# Patient Record
Sex: FEMALE | Race: WHITE
[De-identification: names, ages, dates, MRNs, and addresses within clinical notes are randomized per-mention and may not be internally consistent; named-entity substitution may affect disease eponyms.]

---

## 2017-01-10 NOTE — RADIATION ONCOLOGY FOLLOW-UP
Radiation Oncology Follow-Up


Date of Visit


Alex 10, 2017.





Reason For Visit


Annual follow-up





Radiation Completion Date


5/28/14





Diagnosis





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


Status:  Resolved        Onset Date:  8/19/2013


Histology Subtype:  ductal


Stage:  ll (B)


Permanent Comment:  Right breast mass


Status post biopsy of the right breast and axilla revealing infiltrating ductal 

carcinoma 08/19/2013


Estrogen receptor negative, progesterone receptor negative, HER-2/terry negative


Status post neoadjuvant chemotherapy Adriamycin and Cytoxan followed by Taxol 

for 4 cycles


Status post right breast skin sparing mastectomy and right axillary lymph node 

dissection, left breast skin sparing mastectomy and lymph node injection and 

sentinel lymph node biopsy 02/26/2015


Right breast [id number] left breast no pathologic findings


Status post reconstruction with bilateral silicone implants


Status post completion of radiation therapy 05/28/2014 received 6120 cGy


BRCA1 testing positive  Last Edited By: Shana Tanner on Jan 12, 2016 13:29





Interim History


She's been doing well over this past year.  She has noticed no masses or 

tenderness and no change of the axilla.  At her last visit she had some mild 

swelling and discomfort of the left axilla.  This has resolved.  She has 

noticed no swelling of her arm.  She had previously undergone physical therapy 

which helped to relieve the swelling and discomfort.  She continues regular 

follow-up with the plastic surgeon.  She has been discharged from the breast 

surgeon.  She continues regular follow-up with medical oncology.





Allergies


Coded Allergies:  


     Sulfa Drugs (Verified  Allergy, Unknown, HIVES, 1/14/16)





Home Medications


No Active Prescriptions or Reported Meds





Review of Systems


Gastrointestinal:  


   Symptoms:  WNL


Oral:  


   Symptoms:  No Problems


Respiratory:  


   Symptoms:  WNL


Urinary:  


   Symptoms:  WNL


Skin:  


   Symptoms:  No Problems


Breast:  


   Right Upper Arm Measurement:  29.0


   Right Mid Arm Measurement:  23.3


   Right Wrist Measurement:  15.3


   Left Upper Arm Measurement:  29.0


   Left Mid Arm Measurement:  23.3


   Left Wrist Measurement:  15.5


   Arm Dominence:  Right





Physical Exam





Vital Signs








  Date Time  Temp Pulse Resp B/P Pulse Ox O2 Delivery O2 Flow Rate FiO2


 


1/10/17 14:22 37.0 88 12 128/85 96   








Fatigue:  None


General Appearance:  no apparent distress


Eyes:  normal inspection, EOMI


ENT:  normal ENT inspection, hearing grossly normal


Neck:  no adenopathy


Respiratory/Chest:  lungs clear, no respiratory distress, no accessory muscle 

use


Breast:


Examination of the chest reveals bilateral implants.  There is some mild 

dryness of the skin which is patchy bilaterally.  This is mostly noted in the 

inner portion of the implanted breast area.  There are no masses or tenderness 

no axillary adenopathy.  Using the Huntington score cosmesis she has an excellent 

outcome.  There are no skin retractions or telangiectasis.


Cardiovascular:  regular rate, rhythm, no gallop, no murmur


Abdomen:  non tender, soft


Extremities:  no pedal edema


Neurologic/Psychiatric:  no motor/sensory deficits, alert, normal mood/affect


Skin:  warm/dry


Lymphatic:  no adenopathy





Laboratory Studies











Test


  10/11/16


15:56


 


White Blood Count


  4.36 K/uL


(4.8-10.8)


 


Red Blood Count


  4.22 M/uL


(4.2-5.4)


 


Hemoglobin


  12.6 g/dl


(12.0-16.0)


 


Hematocrit 37.9 % (37-47) 


 


Mean Corpuscular Volume


  89.8 fL


()


 


Mean Corpuscular Hemoglobin


  29.9 pg


(25-34)


 


Mean Corpuscular Hemoglobin


Concent 33.2 g/dl


(32-36)


 


Platelet Count


  228 K/uL


(130-400)


 


Mean Platelet Volume


  9.2 fL


(7.4-10.4)


 


Neutrophils (%) (Auto) 48.6 % 


 


Lymphocytes (%) (Auto) 39.9 % 


 


Monocytes (%) (Auto) 8.7 % 


 


Eosinophils (%) (Auto) 2.3 % 


 


Basophils (%) (Auto) 0.5 % 


 


Neutrophils # (Auto)


  2.12 K/uL


(1.4-6.5)


 


Lymphocytes # (Auto)


  1.74 K/uL


(1.2-3.4)


 


Monocytes # (Auto)


  0.38 K/uL


(0.11-0.59)


 


Eosinophils # (Auto)


  0.10 K/uL


(0-0.5)


 


Basophils # (Auto)


  0.02 K/uL


(0-0.2)


 


RDW Standard Deviation


  43.2 fL


(36.4-46.3)


 


RDW Coefficient of Variation


  13.3 %


(11.5-14.5)


 


Immature Granulocyte % (Auto) 0.0 % 


 


Immature Granulocyte # (Auto)


  0.00 K/uL


(0.00-0.02)


 


Sodium Level


  140 mmol/L


(136-145)


 


Potassium Level


  3.9 mmol/L


(3.5-5.1)


 


Chloride Level


  104 mmol/L


()


 


Carbon Dioxide Level


  29 mmol/L


(21-32)


 


Anion Gap


  7.0 mmol/L


(3-11)


 


Blood Urea Nitrogen


  11 mg/dl


(7-18)


 


Creatinine


  0.65 mg/dl


(0.60-1.20)


 


Est Creatinine Clear Calc


Drug Dose 90.1 ml/min 


 


 


Estimated GFR (


American) 117.5 


 


 


Estimated GFR (Non-


American 101.4 


 


 


BUN/Creatinine Ratio 16.3 (10-20) 


 


Random Glucose


  101 mg/dl


(70-99)


 


Calcium Level


  9.3 mg/dl


(8.5-10.1)


 


Total Bilirubin


  0.5 mg/dl


(0.2-1)


 


Aspartate Amino Transferase


(AST) 41 U/L (15-37) 


 


 


Alanine Aminotransferase (ALT) 32 U/L (12-78) 


 


Alkaline Phosphatase


  76 U/L


()


 


Total Protein


  7.8 gm/dl


(6.4-8.2)


 


Albumin


  3.9 gm/dl


(3.4-5.0)


 


Globulin


  3.9 gm/dl


(2.5-4.0)


 


Albumin/Globulin Ratio 1.0 (0.9-2) 











Assessment & Plan


Plan: Continue regular follow-up with medical oncology as well as her plastic 

surgeon.  Continue follow-up with her primary care physician.  We asked her to 

return to our office in 1 year.  She may call if she has a questions or 

concerns.  Also recommended the use of Aquaphor to the areas of skin dryness.





Total Time In Follow-Up


I spent 15 minutes speaking to the patient and performing examination.  I spent 

15 minutes reviewing information in completing this note.





Copy To


Cesilia Aaron CRNP; Mike Lyman D.O.Int.Med.

## 2017-01-25 NOTE — DIAGNOSTIC IMAGING REPORT
TWO VIEW CHEST



CLINICAL HISTORY: Cough. Atypical chest pain.



FINDINGS: PA and lateral chest radiographs are compared to study dated 2/16/2013

and correlated with chest CT dated 9/4/2013. The cardiomediastinal silhouette is

unremarkable.  The lungs and pleural spaces are clear. Right apical scarring is

identified. There is no pneumothorax. The skeletal structures are osteopenic.

The bony thorax appears intact. Surgical clips are noted both axillae.



IMPRESSION: No active disease in the chest.







Electronically signed by:  Nemesio Manriquez M.D.

1/25/2017 7:47 PM



Dictated Date/Time:  1/25/2017 7:45 PM

## 2017-01-31 NOTE — DIAGNOSTIC IMAGING REPORT
THYROID ULTRASONOGRAPHY



CLINICAL HISTORY: Breast carcinoma, cervical lymphadenopathy.    



COMPARISON STUDY:  No previous studies for comparison.



FINDINGS: The right lobe measures 5.2 x 1.4 x 2.1 cm. The left lobe measures 5.4

x 1.1 x 1.8 cm. There is a 6 x 4 x 3 mm hypoechoic nodule within the left lobe

laterally. No right lobe nodules are visualized.



Within the right neck in the region of concern, there are 2 hypoechoic lesions

the largest of which measures 7 mm. These likely represent lymph nodes. Despite

the normal size, no normal fatty hilum is visualized and therefore they may be

pathologic. Close follow-up is advocated.



IMPRESSION:  

1. Within the right lateral neck there are two hypoechoic lesions the largest of

which measures 7 mm. These likely represent lymph nodes and are potentially 

pathologic as normal fatty shilpa are not visualized.

2. 6 mm left lobe thyroid nodule









Electronically signed by:  Diomedes Contreras M.D.

1/31/2017 9:43 AM



Dictated Date/Time:  1/31/2017 9:40 AM

## 2017-02-07 NOTE — DIAGNOSTIC IMAGING REPORT
ULTRASOUND-GUIDED FINE-NEEDLE ASPIRATION BIOPSY OF RIGHT NECK LYMPH NODES



CLINICAL HISTORY: CERVICAL LYMPHADENOPATHY    



COMPARISON STUDY:  Ultrasound study dated 1/31/2017



FINDINGS: A timeout was performed. The risks of procedure were explained the

patient and informed consent was obtained. The patient was prepped in a sterile

fashion. The skin was anesthetized 1% lidocaine. Under ultrasound guidance, 4

passes utilizing a 25-gauge needle were performed into the right posterior

triangle lymph nodes of concern. Initial pathologic review indicates

satisfactory material. Final pathology is pending at this time.



IMPRESSION:  Successful ultrasound-guided fine-needle aspiration biopsy of right

neck posterior triangle lymph nodes. 









Electronically signed by:  Diomedes Contreras M.D.

2/7/2017 11:01 AM



Dictated Date/Time:  2/7/2017 10:54 AM

## 2017-02-22 NOTE — DIAGNOSTIC IMAGING REPORT
PET/CT



CLINICAL HISTORY: Triple negative breast cancer with metastasis/recurrence in

right cervical lymph node biopsy.    



TECHNIQUE:  A PET/CT was performed from the skull base through the upper thighs

following intravenous injection of 15.90 mCi of F 18 FDG IV. The injection was

performed at 9:36 AM on February 22, 2017 and imaging began at 10:23 on February 22, 2017. Unenhanced CT was performed for attenuation correction purposes and

anatomic localization.



COMPARISON STUDY:  CT of the chest, abdomen and pelvis September 4, 2013.



FINDINGS: 



Head and neck: An FDG avid left supraclavicular lymph node shown on image 50 of

267 measures 1.8 x 1.2 cm. This node has an SUV max of 5.8. This is new since

chest CT of September 4, 2013. There are several mildly enlarged FDG avid right

supraclavicular and lower cervical lymph nodes as well.



Chest: There are numerous FDG avid bulky left axillary lymph nodes. An upper

left axillary lymph node shown on image 59 measures 4 x 2.5 cm and has an SUV

max of 11.5. An inferior left axillary node shown on image 75 measures 3 x 3 cm

and has an SUV max of 11.2. There are suspected bilateral mastectomies with

bilateral breast implants. Note is made of a lobulated 3.2 x 2.9 cm mass along

the inferior medial aspect of the left implant which has an SUV max of 11.6.

Numerous FDG avid internal mammary lymph nodes are noted. A dominant left

internal mammary chain node shown image 70 measures 2 x 1.4 cm and has an SUV

max of 8.8. There are several extrapleural nodules within the lower aspect of

the right hemithorax, the largest of which is shown on image 101, measuring 2 x

0.9 cm. This has an SUV max of 2.3. Several FDG avid masses are shown within the

musculature of the right lateral chest wall. These suggest metastases as well. 



Abdomen and Pelvis: No suspicious FDG uptake is identified within the abdomen or

the pelvis. There is a gallstone within the gallbladder. There is no abdominal

or pelvic lymphadenopathy.



Musculoskeletal: There is been interval development of a 9 mm sclerotic lesion

within the greater trochanter of the right femur shown on image 226. This has no

significant FDG uptake.



IMPRESSION:  



1. Findings consistent with extensive metastatic disease, including FDG avid

bulky left axillary lymphadenopathy. In addition, there is bilateral

supraclavicular, internal mammary and right axillary lymphadenopathy as well as

several FDG avid metastases within the musculature of the right lateral chest

wall. Several FDG avid right pleural/extrapleural implants suggest metastatic

disease.



2. FDG avid 3.2 x 2.9 cm lobulated mass inferomedial to the left implant. This

is consistent with a neoplastic process and may reflect a primary breast cancer

or metastasis.



3. New 9 mm sclerotic lesion within the greater trochanter of the right femur

which may reflect a small skeletal metastasis.







Electronically signed by:  Rich Chen M.D.

2/22/2017 9:07 PM



Dictated Date/Time:  2/22/2017 12:44 PM

## 2017-07-17 NOTE — DIAGNOSTIC IMAGING REPORT
PET/CT SKULL-THIGH



CLINICAL HISTORY: BREAST CANCER    



COMPARISON STUDY:  2/20/2017



FINDINGS: The patient was injected with 12.5 millicuries of F 18 labeled FDG.

Findings standard induction phase, PET/CT scanning was performed from the skull

base to the upper thigh region.



Within the neck, there is increasing FDG avid focus fusing to the right

nasopharynx near the level of the fossa of Rosenmuller. This has an SUV maximum

of 4.3. There are mildly FDG avid cervical lymph nodes. Posterior triangle lymph

nodes in the right are mildly FDG avid with SUV maximum of 2. The largest node

measures 11 mm. This is minimally larger than on the prior study.



There is marked interval decrease in the size of the previous identified

intensely FDG avid left axillary lymph nodes. The largest node currently

measures 17 mm. This is FDG avid with SUV maximum of 6.4. There is a new focus

of increased FDG activity fusing to a right-sided subcarinal lymph node. This

has an SUV maximum of 4.2. There is been resolution of the pathologic activity

in the region of the left internal mammary chain. There are persistent foci of

intramuscular activity adjacent to the right scapula with SUV maximum of 3.8.

There is decreased activity involving the left anterior chest wall mass which

currently has an SUV maximum of 3.8. There are postsurgical changes of bilateral

mastectomies. Right upper lobe airspace opacities are likely secondary to post

radiation pneumonitis.



There are no FDG avid hepatic or splenic lesions. Note is made of

cholelithiasis. There is no FDG avid adenopathy within the abdomen or pelvis.



Mild diffuse skeletal activity, likely is related to marrow stimulation.



IMPRESSION:  

1. Significant interval improvement, although there does appear to be a new FDG

avid right subcarinal lymph node, as well as increasingly FDG avid focus in the

region of the right nasopharynx.

2. Pathologic lesions involving the left chest wall left axilla and left

internal mammary chain are markedly improved 









Electronically signed by:  Diomedes Contreras M.D.

7/17/2017 12:57 PM



Dictated Date/Time:  7/17/2017 12:46 PM

## 2017-10-16 NOTE — DIAGNOSTIC IMAGING REPORT
CT SCAN OF THE CHEST WITH IV CONTRAST



CLINICAL HISTORY: Breast cancer.



COMPARISON STUDY:  Chest CT dated 9/4/2013. PET/CT dated 7/17/2017.



TECHNIQUE: Following the IV administration of 119 cc of Optiray 320, CT scan of

the thorax was performed from the thoracic inlet to the upper abdomen. Images

are reviewed in the axial, sagittal, and coronal planes. IV contrast was

administered without complication.  A dose lowering technique was utilized

adhering to the principles of ALARA.



CT DOSE: 606.41 mGy.cm



FINDINGS:



Thyroid: Imaged portions of the thyroid gland are normal in size and

attenuation.



Thoracic aorta: The thoracic aorta is normal in caliber and demonstrates

standard 3-vessel arch anatomy. No dissection is seen.



Pulmonary vasculature: The pulmonary trunk is normal in caliber. There are no

filling defects identified in the central pulmonary vessels to indicate

pulmonary embolus. Note that this examination was not protocoled for evaluation

of the pulmonary arteries.



Heart: The heart is normal in size and configuration, and without pericardial

effusion.



Lungs and pleural spaces: There are numerous (greater than 10) pleural-based

metastatic lesions identified at the right lung base posteriorly. Groundglass

opacities and scarring are present at the right apex. The lungs are otherwise

clear. No parenchymal pulmonary lesion is seen. There is no pleural effusion.

The trachea and central airways are clear.



Mediastinum: Mediastinal lymphadenopathy has progressed. A right paratracheal

node on image #81 measures 1.7 x 2.3 cm. A subcarinal node on image #119

measures 1.9 x 3.0 cm. The largest is seen on image #218 and measures 3.2 x 1.3

cm. Additional representative lesions are seen on images #198, #212, #247, and

47, #251, and #263 these lesions extend into the right paravertebral region.



Elizabeth: Clear.



Axillae: Surgical clips are present within the axilla bilaterally. There are

pathologically enlarged left axillary and subpectoral nodes which have increased

in size from previous. The largest left axillary node is seen on image #70 and

measures 3.0 x 1.7 cm (previously measuring up to 1.8 cm). Slightly hyperemic

right subpectoral nodes seen on image #48 measure up to 6 mm and are also

concerning for metastatic disease.



Internal mammary chain: Pathologically enlarged right internal mammary lymph

nodes have increased in size. The largest node is seen on image #85 and measures

1.2 x 1.8 cm. No left internal mammary lymph nodes are identified.



Upper abdomen: There is evidence of hepatic steatosis. A 1.2 cm retrocrural

lymph node is seen image #265.



Skeletal structures: The skeletal structures appear osteopenic. No lytic or

blastic bony lesions are clearly seen.



Soft tissues: There are postoperative changes from bilateral mastectomy with

bilateral breast implants. Large soft tissue implants are again seen in the

chest wall and an increased in size from 7/17/2017. A lesion in the left chest

wall medial to the left breast implant on image #174 measures 3.2 x 4.2 cm

(producing measured up to 2.8 cm). There are least 7 lesions in the right

lateral chest wall seen on images #132-204. The largest measures up to 3.3 x 2.8

cm (previously measured up to 2.8 cm). Small soft tissue implants are seen

superficial to the sternum the midline on images #138, #149, and #158 measuring

up to 1.4 cm. A 1.3 cm lesion is noted in the right shoulder musculature on

image #94.





IMPRESSION:



1. Overall significant progression of multifocal metastatic disease as compared

to 7/17/2017.



2. There are numerous enlarging chest wall lesions as above.



3. There is enlarging axillary, subpectoral, right internal mammary,

mediastinal, and retrocrural lymphadenopathy as above.



4. There is evidence of multifocal pleural-based metastatic disease at the right

lung base. There is no associated pleural effusion.



5. Groundglass change and scarring at the right apex is nonspecific and likely

treatment related. This is similar to previous. There is no evidence of

pneumonia.



6. No destructive bony lesion is clearly seen.



7. Hepatic steatosis.



8. Additional findings as above.







Electronically signed by:  Nemesio Manriquez M.D.

10/16/2017 12:40 PM



Dictated Date/Time:  10/16/2017 12:25 PM

## 2017-10-16 NOTE — DIAGNOSTIC IMAGING REPORT
CT ABD/PELVIS IV AND ORAL CONT



CLINICAL HISTORY: BREAST CA    



COMPARISON STUDY:  9/4/2013, PET/CT scan dated 7/17/2017



TECHNIQUE: Following the IV administration of 119 mL of Optiray-320, CT scan of

the abdomen and pelvis was performed from the lung bases to the proximal femurs.

Images are reviewed in the axial, sagittal, and coronal planes. IV contrast was

administered without complication.  A dose lowering technique was utilized

adhering to the principles of ALARA.





CT DOSE:    



FINDINGS:



Lower chest: There are bilateral breast implants. There is a 42 cm medial right

breast mass. There is a partially visualized mass involving the right lateral

chest wall which measures at least 5 cm in length.



Liver: The contrast-enhanced liver is normal in size, contour, and attenuation.

There is no intrahepatic biliary ductal dilatation. The hepatic veins and portal

veins are patent.



Gallbladder: Cholelithiasis



Spleen: Normal in size and attenuation.



Pancreas: Unremarkable.



Adrenal glands: Unremarkable.



Kidneys: There is an 8 mm left renal cyst. No solid renal masses are visualized.



Bowel: There are no transition zones indicate bowel obstruction. No acute

inflammatory changes are visualized.



Peritoneum: There is no intraperitoneal free air or abdominal ascites.



Vasculature: The abdominal aorta is normal in course and caliber.



Adenopathy: There are enlarged upper abdominal retrocrural lymph nodes. These

appear larger than on the prior PET/CT scan. These measure up to 16 mm in

diameter. There are also enlarged lower right para esophageal lymph nodes. These

also are mildly larger than on the prior study. Right paravertebral

lymphadenopathy, has also increased when compared the preceding study.



 Pelvic viscera: The uterus appears surgically absent.



Skeletal structures: No destructive osseous lesions are seen. There is a stable

nonspecific 9 mm sclerotic focus within the intertrochanteric portion of the

right hip



IMPRESSION:  

1. Progression of disease

2. Enlarging 42 mm left medial breast mass

3. Partially visualized right lateral chest wall mass

4. Progressive right-sided paravertebral lymphadenopathy

5. Progressive para esophageal adenopathy

6. Progressive retrocrural lymphadenopathy







Electronically signed by:  Diomedes Contreras M.D.

10/16/2017 12:20 PM



Dictated Date/Time:  10/16/2017 12:10 PM

## 2017-10-25 NOTE — DIAGNOSTIC IMAGING REPORT
THORACIC SPINE 3 VIEWS ROUTINE, L-SPINE MIN 4 VIEWS ROUTINE



HISTORY:  52 years-old Female M54.9 Back painpt. has breast [id number]

acute back pain with history of breast cancer



COMPARISON: CT chest, abdomen and pelvis 10/16/2017



TECHNIQUE: Frontal and lateral views of the thoracic spine with 5 views of the

lumbar spine



FINDINGS: 



THORACIC:

There are 12 thoracic type vertebral segments. There is a few degrees of convex

right curvature of the midthoracic spine. Mild multilevel endplate spurring and

intervertebral disc space narrowing. No acute fracture or subluxation. No

definite suspicious lytic or blastic bony lesions identified. Surgical clips

project over the upper chest.



LUMBAR:

 5 lumbar type vertebral segments. No spondylolysis or spondylolisthesis. No

acute fracture or subluxation. Mild multilevel endplate spurring and

intervertebral disc space narrowing with facet arthrosis. No suspicious lytic or

blastic bony lesions. Moderate formed stool of the colon, notably the ascending

colon.



IMPRESSION: 

1. No suspicious lytic or blastic bony lesions of the thoracic or lumbar spine.

2. Mostly mild multilevel degenerative changes of the spine without acute

fracture or subluxation. 







The above report was generated using voice recognition software. It may contain

grammatical, syntax or spelling errors.







Electronically signed by:  Kyle Laboy M.D.

10/25/2017 5:20 PM



Dictated Date/Time:  10/25/2017 5:13 PM

## 2017-10-25 NOTE — DIAGNOSTIC IMAGING REPORT
THORACIC SPINE 3 VIEWS ROUTINE, L-SPINE MIN 4 VIEWS ROUTINE



HISTORY:  52 years-old Female M54.9 Back painpt. has breast [id number]

acute back pain with history of breast cancer



COMPARISON: CT chest, abdomen and pelvis 10/16/2017



TECHNIQUE: Frontal and lateral views of the thoracic spine with 5 views of the

lumbar spine



FINDINGS: 



THORACIC:

There are 12 thoracic type vertebral segments. There is a few degrees of convex

right curvature of the midthoracic spine. Mild multilevel endplate spurring and

intervertebral disc space narrowing. No acute fracture or subluxation. No

definite suspicious lytic or blastic bony lesions identified. Surgical clips

project over the upper chest.



LUMBAR:

 5 lumbar type vertebral segments. No spondylolysis or spondylolisthesis. No

acute fracture or subluxation. Mild multilevel endplate spurring and

intervertebral disc space narrowing with facet arthrosis. No suspicious lytic or

blastic bony lesions. Moderate formed stool of the colon, notably the ascending

colon.



IMPRESSION: 

1. No suspicious lytic or blastic bony lesions of the thoracic or lumbar spine.

2. Mostly mild multilevel degenerative changes of the spine without acute

fracture or subluxation. 







The above report was generated using voice recognition software. It may contain

grammatical, syntax or spelling errors.







Electronically signed by:  Kyle Laboy M.D.

10/25/2017 5:20 PM



Dictated Date/Time:  10/25/2017 5:13 PM

## 2018-01-10 ENCOUNTER — HOSPITAL ENCOUNTER (OUTPATIENT)
Dept: HOSPITAL 45 - C.ONC | Age: 53
Discharge: HOME | End: 2018-01-10
Attending: PHYSICIAN ASSISTANT
Payer: COMMERCIAL

## 2018-01-10 VITALS
HEART RATE: 91 BPM | OXYGEN SATURATION: 100 % | TEMPERATURE: 98.06 F | SYSTOLIC BLOOD PRESSURE: 126 MMHG | DIASTOLIC BLOOD PRESSURE: 87 MMHG

## 2018-01-10 DIAGNOSIS — Z92.3: ICD-10-CM

## 2018-01-10 DIAGNOSIS — Z08: Primary | ICD-10-CM

## 2018-01-10 DIAGNOSIS — Z85.3: ICD-10-CM

## 2018-01-10 NOTE — RADIATION ONCOLOGY FOLLOW-UP
Radiation Oncology Follow-Up


Date of Visit


Alex 10, 2018.





Reason For Visit


Annual follow-up





Radiation Completion Date


5/28/14





Diagnosis





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


Status:  Chronic        Onset Date:  8/19/2013


Histology Subtype:  ductal


Stage:  IV


Permanent Comment:  Right breast mass


Status post biopsy of the right breast and axilla revealing infiltrating ductal 

carcinoma 08/19/2013


Estrogen receptor negative, progesterone receptor negative, HER-2/terry negative


Status post neoadjuvant chemotherapy Adriamycin and Cytoxan followed by Taxol 

for 4 cycles


Status post right breast skin sparing mastectomy and right axillary lymph node 

dissection, left breast skin sparing mastectomy and lymph node injection and 

sentinel lymph node biopsy 02/26/2015


Right breast [id number] left breast no pathologic findings


Status post reconstruction with bilateral silicone implants


Status post completion of radiation therapy 05/28/2014 received 6120 cGy


BRCA1 testing positive


Finding of a right neck mass February 2017


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


PET/CT revealing extensive metastatic disease.


6 cycles of chemotherapy with Abraxane


PET/CT revealing improvement July 2017


Recheck CT October 2017 showing progression


Currently undergoing chemotherapy with gemcitabine and carboplatin  Last Edited 

By: Shana Tanner on Alex 10, 2018 16:32





Interim History


One month following her visit last year she palpated a mass in the right neck.  

She underwent an FNA which unfortunately revealed this to be metastatic 

carcinoma of breast origin.  PET/CT revealed significant metastatic disease.  

She began chemotherapy with Abraxane and had a recheck PET/CT then in July 

which showed improvement.  This was after 6 cycles of Abraxane.  Recheck CT in 

October 2017 revealed progression of disease.  She was then started on 

gemcitabine and carboplatin.  She has now received 5 cycles of treatment.  With 

this second regimen of treatment she has less neuropathy and less joint pain.  

She has mild nausea.  She has joint discomfort with Neulasta.  Following the 

initiation of the Abraxane the mass of the neck resolved as well as a 

thickening that could be felt in the left lower breast.





Allergies


Coded Allergies:  


     Tramadol (Verified  Allergy, Intermediate, Nausea/Vomiting , 1/10/18)


     Sulfa Drugs (Verified  Allergy, Unknown, HIVES, 1/14/16)





Home Medications


Scheduled


Potassium Chloride (Micro-K Ext Rel), 10 MEQ PO DAILY





Scheduled PRN


Oxycodone Ir (Roxicodone Ir), 1-2 TAB PO Q4H PRN for Severe Pain





Review of Systems


Gastrointestinal:  


   Symptoms:  WNL, Nausea


   GI Comments:  "Queezy" after chemo


Oral:  


   Symptoms:  No Problems


Respiratory:  


   Symptoms:  WNL


Urinary:  


   Symptoms:  WNL


Skin:  


   Symptoms:  No Problems


Breast:  


   Right Upper Arm Measurement:  28.5


   Right Mid Arm Measurement:  23.1


   Right Wrist Measurement:  15.7


   Left Upper Arm Measurement:  29.1


   Left Mid Arm Measurement:  23.0


   Left Wrist Measurement:  15.8


   Arm Dominence:  Right





Physical Exam





Vital Signs








  Date Time  Temp Pulse Resp B/P (MAP) Pulse Ox O2 Delivery O2 Flow Rate FiO2


 


1/10/18 13:35 36.7 91 16 126/87 100   








Fatigue:  None


General Appearance:  no apparent distress


Eyes:  normal inspection, EOMI


ENT:  normal ENT inspection, hearing grossly normal


Neck:  no adenopathy, thyroid normal


Respiratory/Chest:  lungs clear, no respiratory distress, no accessory muscle 

use


Breast:


Breast examination reveals bilateral implants.  There are no masses or 

tenderness and no axillary adenopathy.  She has no skin retractions or 

telangiectasia.  Using the [place] score cosmesis she has a in excellent 

outcome.


Cardiovascular:  regular rate, rhythm, no gallop, no murmur


Abdomen:  non tender, soft


Extremities:  no pedal edema


Neurologic/Psychiatric:  no motor/sensory deficits, alert, normal mood/affect





Pain Management


Patient Reports Pain:  Yes


Pain Management Plan


The patient has multiple medications available for pain as recommended through 

medical oncology.





Laboratory


Laboratory Results:  not applicable





Pathology


Pathology Results:  were reviewed


Pathology Comments


Reviewed in interim history





Imaging


Imaging Studies:  were reviewed


Imaging Comments


Reviewed in the interim history





Assessment & Plan


Plan: She'll continue regular follow-up with medical oncology and her primary 

care physician.  She is continuing on chemotherapy and has completed 5 cycles 

of gemcitabine and carboplatin.  Recheck scanning per medical oncology.  A 

follow-up appointment with our office was not given.  Discussed with her that 

she is having close follow-up with medical oncology and currently does not need 

to return to our office unless otherwise recommended by medical oncology.  She 

may call our office if she has any questions or concerns.





Total Time


In Follow-Up


I spent 20 minutes speaking to the patient and performing examination.  I spent 

15 minutes reviewing information in completing this note.





Copy To


Mike Lyman D.O.; Ze June D.O.

## 2018-01-25 ENCOUNTER — HOSPITAL ENCOUNTER (OUTPATIENT)
Dept: HOSPITAL 45 - C.ACU | Age: 53
Discharge: HOME | End: 2018-01-25
Attending: SURGERY
Payer: COMMERCIAL

## 2018-01-25 VITALS
OXYGEN SATURATION: 99 % | HEART RATE: 86 BPM | DIASTOLIC BLOOD PRESSURE: 85 MMHG | TEMPERATURE: 98.78 F | SYSTOLIC BLOOD PRESSURE: 125 MMHG

## 2018-01-25 VITALS
BODY MASS INDEX: 27.71 KG/M2 | HEIGHT: 63.5 IN | BODY MASS INDEX: 27.71 KG/M2 | HEIGHT: 63.5 IN | WEIGHT: 158.34 LBS | WEIGHT: 158.34 LBS

## 2018-01-25 VITALS
DIASTOLIC BLOOD PRESSURE: 82 MMHG | OXYGEN SATURATION: 99 % | HEART RATE: 73 BPM | SYSTOLIC BLOOD PRESSURE: 118 MMHG | TEMPERATURE: 98.6 F

## 2018-01-25 VITALS
DIASTOLIC BLOOD PRESSURE: 87 MMHG | OXYGEN SATURATION: 100 % | HEART RATE: 89 BPM | TEMPERATURE: 97.88 F | SYSTOLIC BLOOD PRESSURE: 128 MMHG

## 2018-01-25 VITALS
SYSTOLIC BLOOD PRESSURE: 120 MMHG | TEMPERATURE: 97.88 F | OXYGEN SATURATION: 100 % | HEART RATE: 76 BPM | DIASTOLIC BLOOD PRESSURE: 78 MMHG

## 2018-01-25 DIAGNOSIS — Z80.7: ICD-10-CM

## 2018-01-25 DIAGNOSIS — Z82.5: ICD-10-CM

## 2018-01-25 DIAGNOSIS — Z80.6: ICD-10-CM

## 2018-01-25 DIAGNOSIS — Z82.49: ICD-10-CM

## 2018-01-25 DIAGNOSIS — C50.919: Primary | ICD-10-CM

## 2018-01-25 DIAGNOSIS — Z83.3: ICD-10-CM

## 2018-01-25 DIAGNOSIS — I87.8: ICD-10-CM

## 2018-01-25 DIAGNOSIS — Z80.0: ICD-10-CM

## 2018-01-25 LAB
BASOPHILS # BLD: 0 K/UL (ref 0–0.2)
BASOPHILS NFR BLD: 0 %
EOS ABS #: 0.01 K/UL (ref 0–0.5)
EOSINOPHIL NFR BLD AUTO: 65 K/UL (ref 130–400)
HCT VFR BLD CALC: 26.1 % (ref 37–47)
HGB BLD-MCNC: 8.8 G/DL (ref 12–16)
IG#: 0.01 K/UL (ref 0–0.02)
IMM GRANULOCYTES NFR BLD AUTO: 52 %
LYMPHOCYTES # BLD: 1.02 K/UL (ref 1.2–3.4)
MCH RBC QN AUTO: 31.3 PG (ref 25–34)
MCHC RBC AUTO-ENTMCNC: 33.7 G/DL (ref 32–36)
MCV RBC AUTO: 92.9 FL (ref 80–100)
MONO ABS #: 0.38 K/UL (ref 0.11–0.59)
MONOCYTES NFR BLD: 19.4 %
NEUT ABS #: 0.54 K/UL (ref 1.4–6.5)
NEUTROPHILS # BLD AUTO: 0.5 %
NEUTROPHILS NFR BLD AUTO: 27.6 %
PMV BLD AUTO: 11.7 FL (ref 7.4–10.4)
RED CELL DISTRIBUTION WIDTH CV: 18.1 % (ref 11.5–14.5)
RED CELL DISTRIBUTION WIDTH SD: 58.9 FL (ref 36.4–46.3)
WBC # BLD AUTO: 1.96 K/UL (ref 4.8–10.8)

## 2018-01-25 NOTE — [facility] OPERATIVE REPORT
Operative Report


Operative Date


Jan 25, 2018.





Pre-Operative Diagnosis





Breast cancer, need for long-term venous access





Surgeon


Dr. Mikael Olivares





Findings


Real time ultrasound guidance was used for accessing the left internal jugular 

vein.  Real time fluoroscopy was used interpreted for placement of port.  A 

total of 44.8 seconds of fluoroscopy time was used.





Disposition


Recovery Room / PACU


I attest to the content of the Intraoperative Record and any orders documented 

therein.  Any exceptions are noted below.

## 2018-01-25 NOTE — ANESTHESIOLOGY PROGRESS NOTE
Anesthesia Post Op Note


Date & Time


Jan 25, 2018 at 14:11





Vital Signs


Pain Intensity:  0





Vital Signs Past 12 Hours








  Date Time  Temp Pulse Resp B/P (MAP) Pulse Ox O2 Delivery O2 Flow Rate FiO2


 


1/25/18 13:50 37.0 73 14 118/82 99 Room Air  


 


1/25/18 13:30 36.3 68 14 125/88 99 Nasal Cannula  


 


1/25/18 13:20  72 12 110/82 98 Nasal Cannula  


 


1/25/18 13:10  85 12 126/88 100 Nasal Cannula 2 


 


1/25/18 13:04 36.2 82 12 123/79 100 Nasal Cannula 2 


 


1/25/18 08:13 36.6 76 18 120/78 (92) 100 Room Air  











Notes


Mental Status:  alert / awake / arousable, participated in evaluation


Pt Amnestic to Procedure:  Yes


Nausea / Vomiting:  adequately controlled


Pain:  adequately controlled


Airway Patency, RR, SpO2:  stable & adequate


BP & HR:  stable & adequate


Hydration State:  stable & adequate


Anesthetic Complications:  no major complications apparent

## 2018-01-25 NOTE — HISTORY & PHYSICAL BRIDGE NOTE
H&P Re-Evaluation


Bridge Note:


I have examined the patient, reviewed the History & Physical and in the 

interval since the performance of the History & Physical I have noted the 

following changes of clinical significance: Patient seen and examined, agree 

with history of physical was performed.  Patient with metastatic breast cancer, 

prior port in left internal jugular vein, we will attempt to utilize this vein 

if possible, however she understands that she may require placement on the 

right.  No changes noted

## 2018-01-25 NOTE — DISCHARGE INSTRUCTIONS
Discharge Instructions


Date of Service


Jan 25, 2018.





Visit


Reason for Visit:  Right Breast Cancer





Discharge


Discharge Diagnosis / Problem:  port placement





Discharge Goals


Goal(s):  Therapeutic intervention





Activity Recommendations


Activity Limitations:  as noted below


Shower/Bathe:  no limitations





Anesthesia


.





Post Anesthesia Instructions:





If you have had General Anesthesia or IV Sedation:





*  Do not drive today.


*  Resume driving when surgeon permits.


*  Do not make important decisions or sign legal documents today.


*  Call surgeon for:





   1.  Temperature elevations greater than 101 degrees F.


   2.  Uncontrollable pain.


   3.  Excessive bleeding.


   4.  Persistent nausea and vomiting.


   5.  Medication intolerance (nausea, vomiting or rash).





*  For nausea and vomiting use only clear liquids such as: tea, soda, bouillon 

until nausea subsides, then gradually increase diet as tolerated.





*  If you have any concerns or questions, call your surgeon's office.  If 

physician is unavailable and it is an emergency, call 911 or go to the nearest 

emergency room.





.





Instructions / Follow-Up


Instructions / Follow-Up


Dr. Olivares in approx 2 weeks, call [phone] if you do not have an appt or 

have any questions


It is ok for port to be used


You can take ibuprofen 600 mg three times daily for pain in addition to Tylenol 

or oxycodone





Diet Recommendations


Recommended Home Diet:  no limitations





Pending Studies


Studies pending at discharge:  no





Medical Emergencies








.


Who to Call and When:





Medical Emergencies:  If at any time you feel your situation is an emergency, 

please call 911 immediately.





.





Non-Emergent Contact


Non-Emergency issues call your:  Surgeon


Call Non-Emergent contact if:  you have a fever, temperature is above 101.5, 

your pain is not controlled, wound has increased redness, you have any 

medication questions





.


.








"Provider Documentation" section prepared by Pio Baird.








.

## 2018-01-25 NOTE — [facility] POST OPERATIVE BRIEF NOTE
Immediate Operative Summary


Operative Date


Jan 25, 2018.





Pre-Operative Diagnosis





Breast Cancer, Poor Venous Access





Post-Operative Diagnosis





Breast Cancer, Poor Venous Access





Procedure(s) Performed





Insertion of Mediport with Fluoroscopy, Left Internal Jugular Vein





Surgeon


Dr. Mikael Olivares





Assistant Surgeon(s)


none





Estimated Blood Loss


15ml





Findings


Consistent with Post-Op Diagnosis





Specimens





none per surgeon





Drains


None





Anesthesia Type


None





Complication(s)


none





Disposition


Accompanied Pt To Recover:  no


Disposition:  Recovery Room / PACU

## 2018-01-25 NOTE — [facility] OPERATIVE REPORT
Operative Report


Operative Date


Jan 25, 2018.





Pre-Operative Diagnosis





Breast Cancer, Poor Venous Access





Post-Operative Diagnosis


same





Procedure(s) Performed


Insertion of left internal jugular vein tunneled catheter with subcutaneous 

port using fluoroscopy





Surgeon


Dr. Mikael Olivares





Assistant Surgeon(s)


none





Estimated Blood Loss


15ml





Findings


Left internal jugular vein accessed using ultrasound guidance, on first access 

wire did not go into the SVC.  The wire and needle were removed and the left 

internal jugular vein was reaccessed with ultrasound guidance and the wire went 

into the SVC.  Port placed, good placement on fluoroscopy, draws and flushes 

easily.





Specimens





none per surgeon





Drains


none





Anesthesia


MAC/local





Complication(s)


None





Disposition


Recovery Room / PACU





Indications


52-year-old female with recurrent metastatic breast cancer, need for long-term 

access for chemotherapy.  Plan for Mediport placement.  The risks of the 

procedure were discussed, all questions were answered, and the patient agreed 

to proceed with surgery as planned.





Description of Procedure


The patient was properly identified, consented, and taken to the operating room 

where she was placed in the supine position with both arms tucked and a 

shoulder roll placed vertically.  Monitored anesthesia care was induced.  SCDs 

and a safety belt were placed.  Preoperative antibiotics were administered.  

The patient's chest and neck was prepped and draped in the standard sterile 

fashion.  Surgical timeout was performed and all parties were in agreement that 

this was the correct patient and procedure to be performed and we continued as 

planned.  





The patient was placed in Trendelenburg position.  Local anesthetic was 

injected along the proposed left chest and left neck skin incisions.  Using 

real time ultrasound guidance the left internal jugular vein was accessed on 

the first attempt using the access needle.  The wire was placed and the needle 

was removed.  Fluoroscopy showed that the wire was not passing into the 

superior vena cava.  Wire and needle were removed and the left internal jugular 

vein was reaccessed using real time ultrasound guidance.  A wire was placed and 

this time fluoroscopy showed that the wire was extending into superior vena 

cava.  The needle was removed and the wire left in place.  A transverse skin 

incision was made in the left chest at her old incision site and a pocket was 

created for the port.  A stab incision was made in the left neck next to the 

wire.  The tunneling device was used to pass the catheter under the skin from 

the chest incision into the neck incision.  The dilator and peel-away sheath 

were inserted over the wire.  The catheter was then inserted through the peel-

away sheath and fluoroscopy confirmed placement into the superior vena cava.  

The catheter was cut and attached to the port.  The port was secured into place 

with 3-0 Prolene sutures.  A final x-ray revealed good placement of the port.  

The wound was irrigated and hemostasis was confirmed.  The skin was closed with 

interrupted 3-0 Vicryl deep dermal sutures, followed by 4-0 Monocryl running 

subcuticular suture.  The neck incision was closed with a single 4-0 Monocryl 

subcuticular suture.  Dermabond was placed over the wounds.  The port was 

accessed and lilly blood easily and flushed easily.  It was flushed with 

heparinized saline.





The patient taken to the PACU where she recovered without apparent incident.  

All sponge, instrument and needle counts were correct at the conclusion of the 

procedure.  The patient tolerated the procedure well.


I attest to the content of the Intraoperative Record and any orders documented 

therein.  Any exceptions are noted below.

## 2018-01-25 NOTE — DIAGNOSTIC IMAGING REPORT
CHEST ONE VIEW PORTABLE



CLINICAL HISTORY: Port placement.    



COMPARISON STUDY:  Chest radiograph January 25, 2017 and chest CT October 16, 2017.



FINDINGS: There has been interval placement of a left internal jugular

[id number]. There is no pneumothorax. Catheter tip projects of the mid SVC.

There is a small amount of subcutaneous gas within the left lower neck status

post insertion. There are bilateral axillary surgical clips. There are bilateral

breast implants. Pulmonary vascularity is normal. Cardiomediastinal silhouette

is unremarkable. 



IMPRESSION:  No pneumothorax following placement of a left internal jugular

[id number]. Catheter tip projects over the mid SVC. 









Electronically signed by:  Rich Chen M.D.

1/25/2018 1:52 PM



Dictated Date/Time:  1/25/2018 1:50 PM

## 2018-03-21 ENCOUNTER — HOSPITAL ENCOUNTER (OUTPATIENT)
Dept: HOSPITAL 45 - C.CTS | Age: 53
Discharge: HOME | End: 2018-03-21
Attending: INTERNAL MEDICINE
Payer: COMMERCIAL

## 2018-03-21 DIAGNOSIS — C50.411: Primary | ICD-10-CM

## 2018-03-21 NOTE — DIAGNOSTIC IMAGING REPORT
ABD/PELVIS IV AND ORAL CONT



CLINICAL HISTORY: 52 years-old Female presenting with BREAST CANCER. 



TECHNIQUE: Multidetector CT of the abdomen and pelvis was performed after the

administration of oral and intravenous contrast. IV contrast: 94 mL of Optiray

320. A dose lowering technique was used consistent with the principles of ALARA

(as low as reasonably achievable). 



COMPARISON: 10/16/2017.



CT DOSE (mGy.cm): The estimated cumulative dose is 692.62 inclusive of the CT

chest.



FINDINGS:



 topogram: Unremarkable.



Lung bases: Post treatment changes of the medial left breast with no discrete

visualized mass apparent. Only minimal subcutaneous stranding evident. Bilateral

breast implants unchanged. The right lateral chest wall mass is also not

visualized though possibly outside of the field of view there lung bases with

mosaic attenuation in the left lung, likely small airways disease. No focal

infiltrate or nodule. Normal heart size. No pericardial or pleural effusion. 



Liver: Normal morphology. No liver lesion. Patent hepatic vasculature.



Biliary: No intrahepatic or extrahepatic biliary ductal dilatation. Gallbladder

contains gallstones.



Pancreas: Mild parenchymal atrophy. Small lipomas noted in the pancreatic head

and uncinate process.



Spleen: Normal.



Adrenal glands: Normal.



Kidneys and ureters: Subcentimeter hypodensities in the kidneys, unchanged and

likely cysts. No nephrolithiasis. No hydronephrosis. Ureters normal.



Bladder: Normal.



Pelvic organs: Uterus surgically absent. No adnexal masses.



Bowel: Moderate stool burden in mildly dilated colon. The appendix is normal. No

bowel obstruction.



Peritoneal cavity: No free fluid or intraperitoneal gas.



Lymph nodes: Interval resolution of the esophageal, paravertebral, and

retrocrural lymphadenopathy. Minimal subcentimeter lymph nodes evident in the

aortocaval region (for example series 7 image 211). These are not pathologically

enlarged by CT size criteria.



Vasculature: Atherosclerosis of the normal caliber abdominal aorta. IVC patent.



Abdominal wall: Normal.



Musculoskeletal: Sclerotic lesion in the intertrochanteric region of the right

femur is unchanged and indeterminate no additional lesion is identified.



IMPRESSION:

1.  Significant interval improvement with posttreatment changes of the medial

left breast, nonvisualization of the right lateral chest wall mass, and complete

resolution of lymphadenopathy. No new sites of disease in abdomen or pelvis.



2.  Stable indeterminate sclerotic lesion in the right femur. This was not

present in 2013 and is suspicious for a site of osseous metastatic disease. This

was not FDG avid on most recent PET/CT from 7/17/2017 and could represent

treated disease.











Electronically signed by:  Chad Beverly M.D.

3/21/2018 11:22 AM



Dictated Date/Time:  3/21/2018 11:13 AM

## 2018-03-21 NOTE — DIAGNOSTIC IMAGING REPORT
CT (CHEST) THORAX WITH



CT DOSE: 692.62 mGy.cm



HISTORY: Breast carcinoma  BREAST CANCER



TECHNIQUE: Multiaxial CT images of the chest were performed following the

intravenous administration of contrast.  A dose lowering technique was utilized

adhering to the principles of ALARA.





COMPARISON:  10/16/2017



FINDINGS: Groundglass changes at the right pulmonary apex are unaltered. The

pleural-based densities of the right lung base have essentially resolved. The

mediastinal and hilar adenopathy is considerably improved. There is no

significant residual nodularity. A solitary pretracheal node is present

measuring 11 mm.



Axillary adenopathy is considerably improved. Small residual nodes in the left

axilla have a maximum diameter of 7 mm on the left and 5 mm on the right. Bulky

adenopathy is not identified at the current time. Bilateral breast

augmentation/reconstruction changes are noted bilaterally. These findings are

stable.



The large anterior chest wall lesion has shown near complete resolution with

minimal residual of 1 cm. This residual shows a considerable decrease in density

area in this is considered by CT criteria improved. Multiple additional chest

wall nodules have improved in a similar fashion.







Fatty infiltration of the liver persists. The retrocrural node procedure

described at 1.2 cm has resolved.



IMPRESSION: 



1. Marked improvement in the appearance of the chest.





2. Near complete resolution of the chest wall narayan pathology, axillary

adenopathy, as well as the parenchymal and pleural-based nodularity at the right

lung base.

3. Minimal, if any residual as discussed above.

4. The current study shows no significant adenopathy, no significant pulmonary

nodularity, with no significant evidence at the current time for metastatic

change. 









The above report was generated using voice recognition software.  It may contain

grammatical, syntax or spelling errors.







Electronically signed by:  Matthew Palma M.D.

3/21/2018 11:28 AM



Dictated Date/Time:  3/21/2018 11:21 AM

## 2018-07-31 ENCOUNTER — HOSPITAL ENCOUNTER (OUTPATIENT)
Dept: HOSPITAL 45 - C.LABSPEC | Age: 53
Discharge: HOME | End: 2018-07-31
Attending: INTERNAL MEDICINE
Payer: COMMERCIAL

## 2018-07-31 DIAGNOSIS — C50.411: Primary | ICD-10-CM

## 2018-07-31 LAB
ALBUMIN SERPL-MCNC: 3.5 GM/DL (ref 3.4–5)
ALP SERPL-CCNC: 70 U/L (ref 45–117)
ALT SERPL-CCNC: 29 U/L (ref 12–78)
AST SERPL-CCNC: 24 U/L (ref 15–37)
BUN SERPL-MCNC: 7 MG/DL (ref 7–18)
CALCIUM SERPL-MCNC: 8.3 MG/DL (ref 8.5–10.1)
CO2 SERPL-SCNC: 27 MMOL/L (ref 21–32)
CREAT SERPL-MCNC: 0.61 MG/DL (ref 0.6–1.2)
EOSINOPHIL NFR BLD AUTO: 198 K/UL (ref 130–400)
GLUCOSE SERPL-MCNC: 142 MG/DL (ref 70–99)
HCT VFR BLD CALC: 32.9 % (ref 37–47)
HGB BLD-MCNC: 10.7 G/DL (ref 12–16)
MCH RBC QN AUTO: 31.8 PG (ref 25–34)
MCHC RBC AUTO-ENTMCNC: 32.5 G/DL (ref 32–36)
MCV RBC AUTO: 97.6 FL (ref 80–100)
NRBC BLD AUTO-RTO: 0.6 %
NUCLEATED RED BLOOD CELL ABS: 0.03 K/UL (ref 0–0)
PMV BLD AUTO: 9.5 FL (ref 7.4–10.4)
POTASSIUM SERPL-SCNC: 3.9 MMOL/L (ref 3.5–5.1)
PROT SERPL-MCNC: 6.7 GM/DL (ref 6.4–8.2)
RED CELL DISTRIBUTION WIDTH CV: 15.7 % (ref 11.5–14.5)
RED CELL DISTRIBUTION WIDTH SD: 55.4 FL (ref 36.4–46.3)
SODIUM SERPL-SCNC: 139 MMOL/L (ref 136–145)
WBC # BLD AUTO: 4.68 K/UL (ref 4.8–10.8)

## 2018-08-07 ENCOUNTER — HOSPITAL ENCOUNTER (OUTPATIENT)
Dept: HOSPITAL 45 - C.LABSPEC | Age: 53
Discharge: HOME | End: 2018-08-07
Attending: INTERNAL MEDICINE
Payer: COMMERCIAL

## 2018-08-07 DIAGNOSIS — C50.411: Primary | ICD-10-CM

## 2018-08-07 LAB
ALBUMIN SERPL-MCNC: 3.3 GM/DL (ref 3.4–5)
ALP SERPL-CCNC: 82 U/L (ref 45–117)
ALT SERPL-CCNC: 32 U/L (ref 12–78)
AST SERPL-CCNC: 20 U/L (ref 15–37)
BASOPHILS # BLD: 0 K/UL (ref 0–0.2)
BASOPHILS NFR BLD: 0 %
BUN SERPL-MCNC: 10 MG/DL (ref 7–18)
CALCIUM SERPL-MCNC: 7.6 MG/DL (ref 8.5–10.1)
CO2 SERPL-SCNC: 25 MMOL/L (ref 21–32)
CREAT SERPL-MCNC: 0.53 MG/DL (ref 0.6–1.2)
EOS ABS #: 0.01 K/UL (ref 0–0.5)
EOSINOPHIL NFR BLD AUTO: 65 K/UL (ref 130–400)
GLUCOSE SERPL-MCNC: 107 MG/DL (ref 70–99)
HCT VFR BLD CALC: 29.5 % (ref 37–47)
HGB BLD-MCNC: 9.7 G/DL (ref 12–16)
IG#: 0.01 K/UL (ref 0–0.02)
IMM GRANULOCYTES NFR BLD AUTO: 33.5 %
LYMPHOCYTES # BLD: 0.89 K/UL (ref 1.2–3.4)
MCH RBC QN AUTO: 32 PG (ref 25–34)
MCHC RBC AUTO-ENTMCNC: 32.9 G/DL (ref 32–36)
MCV RBC AUTO: 97.4 FL (ref 80–100)
MONO ABS #: 0.58 K/UL (ref 0.11–0.59)
MONOCYTES NFR BLD: 21.8 %
NEUT ABS #: 1.17 K/UL (ref 1.4–6.5)
NEUTROPHILS # BLD AUTO: 0.4 %
NEUTROPHILS NFR BLD AUTO: 43.9 %
PMV BLD AUTO: 10 FL (ref 7.4–10.4)
POTASSIUM SERPL-SCNC: 3.4 MMOL/L (ref 3.5–5.1)
PROT SERPL-MCNC: 6.4 GM/DL (ref 6.4–8.2)
RED CELL DISTRIBUTION WIDTH CV: 15.3 % (ref 11.5–14.5)
RED CELL DISTRIBUTION WIDTH SD: 53.7 FL (ref 36.4–46.3)
SODIUM SERPL-SCNC: 140 MMOL/L (ref 136–145)
WBC # BLD AUTO: 2.66 K/UL (ref 4.8–10.8)

## 2018-08-21 ENCOUNTER — HOSPITAL ENCOUNTER (OUTPATIENT)
Dept: HOSPITAL 45 - C.LABSPEC | Age: 53
Discharge: HOME | End: 2018-08-21
Attending: INTERNAL MEDICINE
Payer: COMMERCIAL

## 2018-08-21 DIAGNOSIS — C50.411: Primary | ICD-10-CM

## 2018-08-21 LAB
ALBUMIN SERPL-MCNC: 3.3 GM/DL (ref 3.4–5)
ALP SERPL-CCNC: 78 U/L (ref 45–117)
ALT SERPL-CCNC: 26 U/L (ref 12–78)
AST SERPL-CCNC: 21 U/L (ref 15–37)
BASOPHILS # BLD: 0.01 K/UL (ref 0–0.2)
BASOPHILS NFR BLD: 0.2 %
BUN SERPL-MCNC: 11 MG/DL (ref 7–18)
CALCIUM SERPL-MCNC: 8.4 MG/DL (ref 8.5–10.1)
CO2 SERPL-SCNC: 26 MMOL/L (ref 21–32)
CREAT SERPL-MCNC: 0.59 MG/DL (ref 0.6–1.2)
EOS ABS #: 0.09 K/UL (ref 0–0.5)
EOSINOPHIL NFR BLD AUTO: 291 K/UL (ref 130–400)
GLUCOSE SERPL-MCNC: 156 MG/DL (ref 70–99)
HCT VFR BLD CALC: 30.9 % (ref 37–47)
HGB BLD-MCNC: 10 G/DL (ref 12–16)
IG#: 0.01 K/UL (ref 0–0.02)
IMM GRANULOCYTES NFR BLD AUTO: 10.9 %
LYMPHOCYTES # BLD: 0.59 K/UL (ref 1.2–3.4)
MCH RBC QN AUTO: 31.8 PG (ref 25–34)
MCHC RBC AUTO-ENTMCNC: 32.4 G/DL (ref 32–36)
MCV RBC AUTO: 98.4 FL (ref 80–100)
MONO ABS #: 0.61 K/UL (ref 0.11–0.59)
MONOCYTES NFR BLD: 11.2 %
NEUT ABS #: 4.12 K/UL (ref 1.4–6.5)
NEUTROPHILS # BLD AUTO: 1.7 %
NEUTROPHILS NFR BLD AUTO: 75.8 %
PMV BLD AUTO: 9.9 FL (ref 7.4–10.4)
POTASSIUM SERPL-SCNC: 3.5 MMOL/L (ref 3.5–5.1)
PROT SERPL-MCNC: 6.7 GM/DL (ref 6.4–8.2)
RED CELL DISTRIBUTION WIDTH CV: 15.8 % (ref 11.5–14.5)
RED CELL DISTRIBUTION WIDTH SD: 56.5 FL (ref 36.4–46.3)
SODIUM SERPL-SCNC: 141 MMOL/L (ref 136–145)
WBC # BLD AUTO: 5.43 K/UL (ref 4.8–10.8)